# Patient Record
Sex: FEMALE | HISPANIC OR LATINO | URBAN - NONMETROPOLITAN AREA
[De-identification: names, ages, dates, MRNs, and addresses within clinical notes are randomized per-mention and may not be internally consistent; named-entity substitution may affect disease eponyms.]

---

## 2018-09-24 ENCOUNTER — OFFICE VISIT (OUTPATIENT)
Dept: URBAN - NONMETROPOLITAN AREA CLINIC 18 | Facility: CLINIC | Age: 33
End: 2018-09-24
Payer: COMMERCIAL

## 2018-09-24 PROCEDURE — 92014 COMPRE OPH EXAM EST PT 1/>: CPT | Performed by: OPTOMETRIST

## 2018-09-24 RX ORDER — CYCLOSPORINE 0.5 MG/ML
0.05 % EMULSION OPHTHALMIC
Qty: 2 | Refills: 6 | Status: INACTIVE
Start: 2018-09-24 | End: 2018-09-24

## 2018-09-24 ASSESSMENT — KERATOMETRY
OS: 45.00
OD: 45.88

## 2018-09-24 ASSESSMENT — INTRAOCULAR PRESSURE
OS: 17
OD: 17

## 2018-09-24 ASSESSMENT — VISUAL ACUITY
OD: 20/20
OS: 20/25

## 2018-09-24 NOTE — IMPRESSION/PLAN
Impression: Lattice degeneration of retina, left eye: H35.412. Plan: The condition was explained to patient. There is no evidence of retinal pathology. All signs and risks of retinal detachment and tears were discussed in detail. Patient instructed to call office immediately if any symptoms noted. No further treatment required unless signs/symptoms of retinal detachment develop.

## 2018-09-24 NOTE — IMPRESSION/PLAN
Impression: Benign neoplasm of right choroid: D31.31. Plan: Ed pt regarding condition. No need for treatment. Continue to monitor on regular intervals.

## 2018-09-24 NOTE — IMPRESSION/PLAN
Impression: Myopia, bilateral: H52.13. Plan: Reviewed refractive prescription in detail with patient and need for glasses to improve vision at this time. Discussed lens options and designs. Ok to release spectacle prescription.

## 2018-09-24 NOTE — IMPRESSION/PLAN
Impression: Dry eye syndrome of bilateral lacrimal glands: H04.123. Plan: Discussed condition in detail with patient. Recommended artificial tears QID OU, warm compresses BID for 5+ mins OU, and Omega-3 supplement 1500mg PO QD. Rx Restasis BID OU, explained to patient potential for burning after instillation and may take up to 6 months for full effectiveness. RTC for punctal occlusion trial after 2 months on restasis. Also recommended pt wait for CL fitting until dry eye is under better control.

## 2019-04-01 ENCOUNTER — PROCEDURE (OUTPATIENT)
Dept: URBAN - NONMETROPOLITAN AREA CLINIC 18 | Facility: CLINIC | Age: 34
End: 2019-04-01
Payer: COMMERCIAL

## 2019-04-01 DIAGNOSIS — H52.13 MYOPIA, BILATERAL: ICD-10-CM

## 2019-04-01 PROCEDURE — 68761 CLOSE TEAR DUCT OPENING: CPT | Performed by: OPTOMETRIST

## 2019-04-01 NOTE — IMPRESSION/PLAN
Impression: Myopia, bilateral: H52.13. Plan: Will fit pt with CLs at next follow-up if there is improvement in signs/symptoms of dry eye.

## 2019-04-01 NOTE — IMPRESSION/PLAN
Impression: Dry eye syndrome of bilateral lacrimal glands: H04.123. Plan: Discussed condition in detail with patient. Recommended to continue artificial tears (Refresh plus PF) QID OU, warm compresses BID for 5+ mins OU, and Omega-3 supplement 1500mg PO QD. Restasis not covered by insurance. BVI Parasol permanent punctal plugs, sz small placed in BLL. Also recommended pt wait for CL fitting until dry eye is under better control. RTV for follow up and CTL fitting.

## 2019-04-29 ENCOUNTER — OFFICE VISIT (OUTPATIENT)
Dept: URBAN - NONMETROPOLITAN AREA CLINIC 18 | Facility: CLINIC | Age: 34
End: 2019-04-29
Payer: COMMERCIAL

## 2019-04-29 PROCEDURE — 99213 OFFICE O/P EST LOW 20 MIN: CPT | Performed by: OPTOMETRIST

## 2019-04-29 NOTE — IMPRESSION/PLAN
Impression: Dry eye syndrome of bilateral lacrimal glands: H04.123. Plan: Discussed condition in detail with patient. Recommended continue artificial tears (Refresh plus PF) QID OU. Consider genteal gel QHS OU. Pt will try to start the recommended Omega-3 supplement 1500mg PO QD. Punctal plugs in place LL OU. Restasis not covered by insurance. Pt highly interested in resuming CL wear. Ed pt we can try CLs again, but there is a high risk of dry eye symptoms, irritation and red eye that may occur with CL wear.

## 2019-04-29 NOTE — IMPRESSION/PLAN
Impression: Myopia, bilateral: H52.13. Plan: Fit with biofinity toric SCLs today. Pt stated they were more comfortable and clear than previous CTLs. Ed pt to slowly increase wear time and not to exceed 12 hours per day. No sleeping in CLs. Will order new trials based on over-refraction today and follow-up on fit in 1-2 weeks.

## 2019-10-21 ENCOUNTER — OFFICE VISIT (OUTPATIENT)
Dept: URBAN - NONMETROPOLITAN AREA CLINIC 18 | Facility: CLINIC | Age: 34
End: 2019-10-21
Payer: COMMERCIAL

## 2019-10-21 PROCEDURE — 92012 INTRM OPH EXAM EST PATIENT: CPT | Performed by: OPTOMETRIST

## 2019-10-21 ASSESSMENT — INTRAOCULAR PRESSURE
OD: 16
OS: 16

## 2019-10-21 ASSESSMENT — VISUAL ACUITY
OS: 20/20
OD: 20/20

## 2019-10-21 NOTE — IMPRESSION/PLAN
Impression: Dry eye syndrome of bilateral lacrimal glands: H04.123. Plan: Discussed condition in detail with patient. Recommended continue artificial tears (Refresh plus PF) QID OU. Consider genteal gel QHS OU. Pt will try to start the recommended Omega-3 supplement 1500mg PO QD. Punctal plugs in place LL OU. Restasis previously not covered by insurance and now pt does not have medical insurance at all.

## 2021-01-25 ENCOUNTER — OFFICE VISIT (OUTPATIENT)
Dept: URBAN - NONMETROPOLITAN AREA CLINIC 18 | Facility: CLINIC | Age: 36
End: 2021-01-25
Payer: COMMERCIAL

## 2021-01-25 PROCEDURE — 92014 COMPRE OPH EXAM EST PT 1/>: CPT | Performed by: OPTOMETRIST

## 2021-01-25 ASSESSMENT — INTRAOCULAR PRESSURE
OS: 16
OD: 16

## 2021-01-25 ASSESSMENT — VISUAL ACUITY
OS: 20/20
OD: 20/20

## 2021-01-25 NOTE — IMPRESSION/PLAN
Impression: Dry eye syndrome of bilateral lacrimal glands: H04.123. Plan: Discussed condition in detail with patient. Punctal plugs in place. Explained condition does not have a cure and will need artificial tears for maintenance.  Recommended artificial tears (Refresh Relieva sample given) QID OU, warm compresses BID for 5+ mins OU, and Omega-3 supplement 1500mg PO QD.

## 2021-01-25 NOTE — IMPRESSION/PLAN
Impression: Benign neoplasm of right choroid: D31.31. Plan: Ed pt regarding condition. Stable nevus. No need for treatment.  Continue to monitor on regular intervals with dilated exam.

## 2021-08-10 ENCOUNTER — OFFICE VISIT (OUTPATIENT)
Dept: URBAN - NONMETROPOLITAN AREA CLINIC 18 | Facility: CLINIC | Age: 36
End: 2021-08-10
Payer: COMMERCIAL

## 2021-08-10 DIAGNOSIS — D31.31 BENIGN NEOPLASM OF RIGHT CHOROID: ICD-10-CM

## 2021-08-10 DIAGNOSIS — H35.412 LATTICE DEGENERATION OF RETINA, LEFT EYE: ICD-10-CM

## 2021-08-10 DIAGNOSIS — H04.123 DRY EYE SYNDROME OF BILATERAL LACRIMAL GLANDS: ICD-10-CM

## 2021-08-10 PROCEDURE — 99212 OFFICE O/P EST SF 10 MIN: CPT | Performed by: OPTOMETRIST

## 2021-08-10 PROCEDURE — 92310 CONTACT LENS FITTING OU: CPT | Performed by: OPTOMETRIST

## 2021-08-10 ASSESSMENT — INTRAOCULAR PRESSURE
OS: 20
OD: 20

## 2021-08-10 ASSESSMENT — VISUAL ACUITY
OD: 20/20
OS: 20/20

## 2021-10-05 ENCOUNTER — PROCEDURE (OUTPATIENT)
Dept: URBAN - NONMETROPOLITAN AREA CLINIC 18 | Facility: CLINIC | Age: 36
End: 2021-10-05

## 2021-10-05 PROCEDURE — 92310 CONTACT LENS FITTING OU: CPT | Performed by: OPTOMETRIST

## 2021-11-22 ENCOUNTER — OFFICE VISIT (OUTPATIENT)
Dept: URBAN - NONMETROPOLITAN AREA CLINIC 18 | Facility: CLINIC | Age: 36
End: 2021-11-22
Payer: COMMERCIAL

## 2021-11-22 DIAGNOSIS — G51.4 FACIAL MYOKYMIA: Primary | ICD-10-CM

## 2021-11-22 PROCEDURE — 99213 OFFICE O/P EST LOW 20 MIN: CPT | Performed by: OPTOMETRIST

## 2021-11-22 NOTE — IMPRESSION/PLAN
Impression: Facial myokymia: G51.4. Plan: Discussed status with patient. Discussed [place] BID or Pataday QD to help minimize symptoms. If no improvement after 1-2 weeks, pt advised contact cosmetic clinic where filler was performed to discuss treatment options (Botox may help relieve symptoms).

## 2021-11-22 NOTE — IMPRESSION/PLAN
Impression: Dry eye syndrome of bilateral lacrimal glands: H04.123. Plan: Discussed condition in detail with patient. Recommended continue artificial tears (Refresh plus PF) QID OU. Consider genteal gel QHS OU. Continue Omega-3 supplement 1500mg PO QD. Punctal plugs in place LL OU. Restasis previously not covered by insurance.

## 2023-02-06 ENCOUNTER — OFFICE VISIT (OUTPATIENT)
Dept: URBAN - NONMETROPOLITAN AREA CLINIC 18 | Facility: CLINIC | Age: 38
End: 2023-02-06
Payer: COMMERCIAL

## 2023-02-06 DIAGNOSIS — H10.413 CONJUNCTIVITIS - ALLERGIC: ICD-10-CM

## 2023-02-06 DIAGNOSIS — H52.13 MYOPIA, BILATERAL: ICD-10-CM

## 2023-02-06 DIAGNOSIS — H04.123 DRY EYE SYNDROME OF BILATERAL LACRIMAL GLANDS: Primary | ICD-10-CM

## 2023-02-06 DIAGNOSIS — H35.413 LATTICE DEGENERATION OF RETINA, BILATERAL: ICD-10-CM

## 2023-02-06 DIAGNOSIS — R51.9 HEADACHE, UNSPECIFIED: ICD-10-CM

## 2023-02-06 DIAGNOSIS — D31.31 BENIGN NEOPLASM OF RIGHT CHOROID: ICD-10-CM

## 2023-02-06 PROCEDURE — 92014 COMPRE OPH EXAM EST PT 1/>: CPT | Performed by: OPTOMETRIST

## 2023-02-06 RX ORDER — OLOPATADINE HYDROCHLORIDE OPHTHALMIC 2 MG/ML
0.2 % SOLUTION OPHTHALMIC
Qty: 2.5 | Refills: 4 | Status: ACTIVE
Start: 2023-02-06

## 2023-02-06 ASSESSMENT — INTRAOCULAR PRESSURE
OS: 20
OD: 18

## 2023-02-06 ASSESSMENT — VISUAL ACUITY
OD: 20/20
OS: 20/20

## 2023-02-06 NOTE — IMPRESSION/PLAN
Impression: Dry eye syndrome of bilateral lacrimal glands: H04.123. Plan: Discussed condition in detail with patient. Recommended continue artificial tears (Refresh plus PF) QID OU. Consider genteal gel QHS OU. Continue Omega-3 supplement 1500mg PO QD. LLL Punctal plug in place. Restasis previously not covered by insurance.

## 2023-10-27 ENCOUNTER — OFFICE VISIT (OUTPATIENT)
Dept: URBAN - NONMETROPOLITAN AREA CLINIC 18 | Facility: CLINIC | Age: 38
End: 2023-10-27
Payer: COMMERCIAL

## 2023-10-27 DIAGNOSIS — H52.13 MYOPIA, BILATERAL: Primary | ICD-10-CM

## 2023-10-27 PROCEDURE — 92015 DETERMINE REFRACTIVE STATE: CPT | Performed by: OPTOMETRIST

## 2023-10-27 ASSESSMENT — VISUAL ACUITY
OS: 20/20
OD: 20/20

## 2023-10-27 ASSESSMENT — INTRAOCULAR PRESSURE
OS: 20
OD: 20

## 2024-07-16 ENCOUNTER — OFFICE VISIT (OUTPATIENT)
Dept: URBAN - NONMETROPOLITAN AREA CLINIC 18 | Facility: CLINIC | Age: 39
End: 2024-07-16
Payer: COMMERCIAL

## 2024-07-16 DIAGNOSIS — H04.123 DRY EYE SYNDROME OF BILATERAL LACRIMAL GLANDS: ICD-10-CM

## 2024-07-16 DIAGNOSIS — H02.721 MADAROSIS OF RIGHT UPPER EYELID AND PERIOCULAR AREA: Primary | ICD-10-CM

## 2024-07-16 DIAGNOSIS — D23.39 OTHER BENIGN NEOPLASM OF SKIN OF OTHER PART OF FACE: ICD-10-CM

## 2024-07-16 DIAGNOSIS — G51.4 FACIAL MYOKYMIA: ICD-10-CM

## 2024-07-16 PROCEDURE — 99213 OFFICE O/P EST LOW 20 MIN: CPT | Performed by: OPTOMETRIST

## 2024-07-16 ASSESSMENT — INTRAOCULAR PRESSURE
OS: 23
OD: 22